# Patient Record
Sex: FEMALE | Race: WHITE
[De-identification: names, ages, dates, MRNs, and addresses within clinical notes are randomized per-mention and may not be internally consistent; named-entity substitution may affect disease eponyms.]

---

## 2019-12-18 ENCOUNTER — HOSPITAL ENCOUNTER (EMERGENCY)
Dept: HOSPITAL 97 - ER | Age: 74
Discharge: HOME | End: 2019-12-18
Payer: COMMERCIAL

## 2019-12-18 DIAGNOSIS — Z88.8: ICD-10-CM

## 2019-12-18 DIAGNOSIS — I10: ICD-10-CM

## 2019-12-18 DIAGNOSIS — I50.9: ICD-10-CM

## 2019-12-18 DIAGNOSIS — E03.9: ICD-10-CM

## 2019-12-18 DIAGNOSIS — R41.82: Primary | ICD-10-CM

## 2019-12-18 DIAGNOSIS — F41.8: ICD-10-CM

## 2019-12-18 DIAGNOSIS — E11.9: ICD-10-CM

## 2019-12-18 DIAGNOSIS — Z95.0: ICD-10-CM

## 2019-12-18 LAB
BUN BLD-MCNC: 22 MG/DL (ref 7–18)
GLUCOSE SERPLBLD-MCNC: 146 MG/DL (ref 74–106)
HCT VFR BLD CALC: 39.4 % (ref 36–45)
INR BLD: 0.98
LYMPHOCYTES # SPEC AUTO: 1.9 K/UL (ref 0.7–4.9)
PMV BLD: 10.9 FL (ref 7.6–11.3)
POTASSIUM SERPL-SCNC: 4.6 MMOL/L (ref 3.5–5.1)
RBC # BLD: 4.03 M/UL (ref 3.86–4.86)

## 2019-12-18 PROCEDURE — 93005 ELECTROCARDIOGRAM TRACING: CPT

## 2019-12-18 PROCEDURE — 36415 COLL VENOUS BLD VENIPUNCTURE: CPT

## 2019-12-18 PROCEDURE — 85025 COMPLETE CBC W/AUTO DIFF WBC: CPT

## 2019-12-18 PROCEDURE — 70496 CT ANGIOGRAPHY HEAD: CPT

## 2019-12-18 PROCEDURE — 85730 THROMBOPLASTIN TIME PARTIAL: CPT

## 2019-12-18 PROCEDURE — 80048 BASIC METABOLIC PNL TOTAL CA: CPT

## 2019-12-18 PROCEDURE — 85610 PROTHROMBIN TIME: CPT

## 2019-12-18 PROCEDURE — 71045 X-RAY EXAM CHEST 1 VIEW: CPT

## 2019-12-18 PROCEDURE — 99285 EMERGENCY DEPT VISIT HI MDM: CPT

## 2019-12-18 PROCEDURE — 70450 CT HEAD/BRAIN W/O DYE: CPT

## 2019-12-18 PROCEDURE — 82947 ASSAY GLUCOSE BLOOD QUANT: CPT

## 2019-12-18 NOTE — RAD REPORT
EXAM DESCRIPTION:  CT - Head angio - 12/18/2019 6:05 pm

 

CLINICAL HISTORY:  stroke symptoms

 

TECHNIQUE:  During dynamic enhancement using nonionic IV contrast, axial 1 millimeter thick images of
 the head were obtained. Sagittal and axial reconstruction images were generated using MIP technique 
and reviewed.

 

All CT scans are performed using dose optimization technique as appropriate and may include automated
 exposure control or mA/KV adjustment according to patient size.

 

COMPARISON:   CT head same date

 

FINDINGS:   No aneurysm or vascular malformation identified.

 

Major venous sinuses are patent.

 

No stenosis, named branch occlusion, vasculitis or other significant vascular finding identifiable. R
ight posterior cerebral artery is supplied via enlarged posterior communicating artery as a normal va
riant. Patient has prominent cavernous carotid calcifications.

 

 

IMPRESSION:   No occlusion, significant stenosis or other significant vascular finding.

## 2019-12-18 NOTE — RAD REPORT
EXAM DESCRIPTION:  CT - Ct Stroke Brain Wo Cont - 12/18/2019 3:07 pm

 

CLINICAL HISTORY:  Left-sided facial weakness, left-sided facial droop and slurred speech

 

CLINICAL HISTORY:  December 2016

 

TECHNIQUE:  Axial 5 millimeter thick images of the head were obtained without IV contrast.

 

All CT scans are performed using dose optimization technique as appropriate and may include automated
 exposure control or mA/KV adjustment according to patient size.

 

FINDINGS:  No intracranial hemorrhage, mass, or cerebral edema. No acute infarction identifiable. No 
cortical edema or sulcal effacement. Mild to moderate atrophy and chronic ischemic changes are presen
t similar to comparison. Gray matter-white matter differentiation is preserved.

Ventricles are in proportion to volume loss. Arterial and physiologic calcifications are present.

 

Visualized portions of the mastoid air cells, paranasal sinuses, and orbits are unremarkable.

 

Findings telephoned to the referring clinician 3:13 p.m..

 

IMPRESSION:  No CT evidence of acute intracranial process.

 

Atrophy and chronic ischemic changes are present similar to 2016.

 

 Chronic ischemic changes can mask nonhemorrhagic acute infarction. MR brain followup can be obtained
 if there is ongoing concern for acute ischemia. No

## 2019-12-18 NOTE — EKG
Test Date:    2019-12-18               Test Time:    15:20:21

Technician:   JOHN                                     

                                                     

MEASUREMENT RESULTS:                                       

Intervals:                                           

Rate:         72                                     

MS:                                                  

QRSD:         166                                    

QT:           490                                    

QTc:          536                                    

Axis:                                                

P:                                                   

MS:                                                  

QRS:          -56                                    

T:            103                                    

                                                     

INTERPRETIVE STATEMENTS:                                       

                                                     

Atrial-sensed ventricular-paced rhythm

with occasional premature ventricular complexes

Abnormal ECG

Compared to ECG 01/03/2018 20:52:04

Ventricular premature complex(es) now present



Electronically Signed On 12-18-19 16:18:04 CST by Duane Flores

## 2019-12-18 NOTE — RAD REPORT
EXAM DESCRIPTION:  RAD - Chest Single View - 12/18/2019 3:44 pm

 

CLINICAL HISTORY:  Code stroke chest film, left-sided facial weakness, altered mental status

 

COMPARISON:  January 2018

 

TECHNIQUE:  AP portable chest image was obtained 1518 hour .

 

FINDINGS:  Lung volumes are very low accentuating heart, vasculature and lung markings. No dense cons
olidation or mass. No prominent pulmonary edema. Mild interstitial edema or infiltrate could be maske
d. Pacemaker is again noted. PICC line has been removed since prior imaging. No measurable pleural ef
fusion and no pneumothorax. No acute bony abnormality seen. No acute aortic findings suspected.

 

IMPRESSION:  Limited portable imaging without peripheral mass or consolidation.

 

Mild failure/ volume overload could easily be masked by the chronic disease and low lung volume.

## 2019-12-18 NOTE — ER
Nurse's Notes                                                                                     

 [facility]                                                                 

Name: Miriam Rosales                                                                                  

Age: 74 yrs                                                                                       

Sex: Female                                                                                       

: 1945                                                                                   

MRN: [id number]                                                                                   

Arrival Date: 2019                                                                          

Time: 14:55                                                                                       

Account#: [id number]                                                                            

Bed 7                                                                                             

Private MD:                                                                                       

Diagnosis: Altered mental status, unspecified                                                     

                                                                                                  

Presentation:                                                                                     

                                                                                             

14:51 Presenting complaint: EMS states: called out for [facility] 45min-1 hr PTA EMS arrival, pt is  sv  

      normally A\T\Ox3 but was then A\T\Ox2 stated by staff, left sided facial droop, Afib w/ 2   

      runs of VTach. Transition of care: patient was received from another setting of care        

      (long-term care facility), [facility]. Onset of symptoms was 2019. Risk      

      Assessment: Do you want to hurt yourself or someone else? Patient reports no desire to      

      harm self or others. Initial Sepsis Screen: Does the patient meet any 2 criteria?           

      Altered Mental Status. No. Patient's initial sepsis screen is negative. Does the            

      patient have a suspected source of infection? No. Patient's initial sepsis screen is        

      negative. Care prior to arrival: IV attempted but was unsuccessful.                         

14:51 Method Of Arrival: EMS: [facility]                                                       sv  

14:54 Note Called and spoke with nurse at [facility] and she stated that pt's last known    sv  

      well was around 1400. She stated that the pt was in the DONs office with the doctor and     

      pt was normal at that time and placed out in the hallway. They went to go see her and       

      noted she was not normal. Stated she had slurred speech, left sided arm and leg             

      weakness, tongue was deviated to the right and no facial droop was noted at that time.      

      Informed Dr Mcarthur of what the NH staff stated and we are to call a Code Stroke.           

14:56 Acuity: FRIDA 2                                                                           sv  

                                                                                                  

Triage Assessment:                                                                                

14:55 General: Appears in no apparent distress. uncomfortable, well developed, Behavior is    sv  

      cooperative, appropriate for age, flat. Pain: Denies pain. Neuro: Level of                  

      Consciousness is awake, obeys commands, confused, Oriented to person, place, Moves all      

      extremities. Speech is slurred, Facial droop on left. Cardiovascular: Patient's skin is     

      warm and dry. Rhythm is atrial fibrillation. Respiratory: Airway is patent Respiratory      

      effort is even, unlabored, Respiratory pattern is regular, symmetrical. Derm: Skin is       

      pale.                                                                                       

                                                                                                  

Historical:                                                                                       

- Allergies:                                                                                      

15:10 Metformin HCl;                                                                          sv  

- Home Meds:                                                                                      

16:27 Abilify 5 mg oral tab 2 tabs once daily [Active]; Depakote Sprinkles 125 mg Oral cpSP 8 sv  

      caps nightly [Active]; Levemir 100 unit/mL subcutaneous soln 20 unit daily [Active];        

      levothyroxine 25 mcg tab 1 tab once daily [Active]; Novolog 100 unit/mL Sub-Q soln          

      [Active]; simvastatin 20 mg Oral tab 1 tab once daily [Active]; Xarelto 20 mg Oral tab      

      1 tab nightly [Active]; Zoloft 100 mg Oral tab 1 tab once daily [Active];                   

- PMHx:                                                                                           

15:10 Diabetes - NIDDM; Hypothyroidism; iron deficiency; Dementia; Muscle weakness; Psychotic sv  

      disorder w/ delusions; Anxiety; Depression; Hypertension; Atrial Fib; CHF; Cardiac          

      pacemaker; Right ankle pressure ulcer; Arthritis;                                           

                                                                                                  

- Immunization history:: Adult Immunizations up to date.                                          

- Social history:: Smoking status: Patient/guardian denies using tobacco.                         

- Ebola Screening: : No symptoms or risks identified at this time.                                

                                                                                                  

                                                                                                  

Screening:                                                                                        

15:00 Abuse screen: Denies threats or abuse. Denies injuries from another. Nutritional        sv  

      screening: No deficits noted. Tuberculosis screening: No symptoms or risk factors           

      identified. Fall Risk No fall in past 12 months (0 pts). Secondary diagnosis (15            

      points) dementia, No IV (0 pts). Ambulatory Aid- None/Bed Rest/Nurse Assist (0 pts).        

      Gait- Normal/Bed Rest/Wheelchair (0 pts) Mental Status- Overestimates/Forgets               

      Limitations (15 pts.). Total Liu Fall Scale indicates Low Risk Score (25-44 pts).         

      Fall prevention measures have been instituted. Side Rails Up X 2 Placed close to            

      Nursing Station Frequent Obs/Assesments occuring As available Patient and Family            

      Educated on Fall Prevention Program and strategies.                                         

16:22 Patient has been NPO before screening. The patient is alert, able to follow commands.   sv  

      The patient exhibits slurred or garbled speech. Provider notified of indication for         

      Speech Therapy consult. The patient failed the bedside swallow screening. The patient       

      will be kept NPO until cleared by Speech Therapy or Physician. Provider notified of         

      bedside swallow screening results: Iban Mcarthur MD.                                        

                                                                                                  

Assessment:                                                                                       

14:52 Reassessment: Dr Mcarthur at the bedside.                                                sv  

15:01 Reassessment: Code Stroke called.                                                       sv  

16:15 Reassessment: Patient appears in no apparent distress at this time. No changes from     sv  

      previously documented assessment. Patient and/or family updated on plan of care and         

      expected duration. Pain level reassessed. Family at the bedside.                            

18:23 Reassessment: Patient appears in no apparent distress at this time. No changes from     sv  

      previously documented assessment. Patient and/or family updated on plan of care and         

      expected duration. Pain level reassessed.                                                   

19:35 General: Appears in no apparent distress. comfortable, Behavior is calm, cooperative.   ao  

      Pain: Complains of pain in headache. Neuro: Level of Consciousness is awake, alert,         

      obeys commands, Oriented to person, place, time, situation, Appropriate for age Moves       

      all extremities. Full function Speech is normal. Cardiovascular: Heart tones S1 S2          

      Capillary refill < 3 seconds. Respiratory: Airway is patent Respiratory effort is even,     

      unlabored, Respiratory pattern is regular, symmetrical. GI: Abdomen is round obese. :     

      No signs and/or symptoms were reported regarding the genitourinary system. EENT: No         

      signs and/or symptoms were reported regarding the EENT system. Derm: Skin is intact,        

      Skin is pink, warm \T\ dry. Skin temperature is warm Decubitus. Musculoskeletal: No signs   

      and/or symptoms reported regarding the musculoskeletal system.                              

20:16 Reassessment: Patient appears in no apparent distress at this time. Patient and/or      ao  

      family updated on plan of care and expected duration. Pain level reassessed.                

20:53 Reassessment: Called report to [facility] and gave report to Juan. Pt is being      ao  

      discharge from the hospital. Juan stated he will call [facility] for transportation.            

21:54 Reassessment: Patient appears in no apparent distress at this time. Patient is alert,   ao  

      oriented x 3, equal unlabored respirations, skin warm/dry/pink. Waiting on                  

      transportation.                                                                             

22:09 Reassessment: Hand off care to [facility] for transportation. Pt VS stable.              ao  

                                                                                                  

Vital Signs:                                                                                      

14:52  / 80; Pulse 73; Resp 18; Temp 98; Pulse Ox 100% ; Pain 0/10;                     sv  

15:16  / 87; Pulse 69; Resp 18; Pulse Ox 100% ;                                         sv  

15:45  / 88; Pulse 72; Resp 18; Pulse Ox 100% ;                                         sv  

16:23  / 99; Pulse 74; Resp 19; Pulse Ox 100% ;                                         sv  

16:58  / 86; Pulse 72; Resp 19; Pulse Ox 98% on R/A;                                    sv  

17:45  / 99; Pulse 77; Resp 16; Pulse Ox 100% ;                                         sv  

18:23  / 98; Pulse 71; Resp 20; Pulse Ox 100% ;                                         sv  

20:16  / 103; Pulse 73; Resp 16; Pulse Ox 97% ;                                         ao  

21:39  / 84; Pulse 77; Resp 19; Pulse Ox 100% on R/A;                                   lp1 

22:11  / 86; Pulse 74; Resp 18; Pulse Ox 96% ;                                          ao  

                                                                                                  

ED Course:                                                                                        

14:55 Patient arrived in ED.                                                                  sv  

14:55 Vanessa Alexander, RN is Primary Nurse.                                                  sv  

14:55 Initial lab(s) drawn, by ED staff, sent to lab.                                         sv  

14:57 Triage completed.                                                                       sv  

14:58 Iban Mcarthur MD is Attending Physician.                                              kdr 

15:00 Inserted saline lock: 20 gauge in right antecubital area, using aseptic technique.      sg  

      Blood collected.                                                                            

15:00 Cardiac monitor on. Pulse ox on. NIBP on.                                               sv  

15:01 Patient moved to CT via stretcher.                                                      sv  

15:01 Patient has correct armband on for positive identification. Bed in low position. Call   sg  

      light in reach. Side rails up X2.                                                           

15:05 Arm band placed on.                                                                     sv  

15:10 Patient moved back from CT.                                                             sv  

15:13 CT Stroke Brain w/o Contrast In Process Unspecified.                                    EDMS

15:44 Stroke CXR 1 View In Process Unspecified.                                               EDMS

17:55 Patient moved to CT via stretcher.                                                      sv  

18:06 Head angio In Process Unspecified.                                                      EDMS

18:15 Patient moved back from CT.                                                             sv  

18:43 Awaiting radiology results.                                                             sv  

19:04 Primary Nurse role handed off by Vanessa Alexander, RN                                   sv  

19:04 Report given to Marguerite SHARIF and Inderjit SHARIF.                                                   sv  

19:08 Inderjit Farnsworth RN is Primary Nurse.                                                       ao  

22:11 No provider procedures requiring assistance completed. IV discontinued, intact,         ao  

      bleeding controlled, No redness/swelling at site. Pressure dressing applied.                

                                                                                                  

Administered Medications:                                                                         

No medications were administered                                                                  

                                                                                                  

                                                                                                  

Point of Care Testing:                                                                            

      Blood Glucose:                                                                              

15:00 Blood Glucose: 139 mg/dL;                                                               sg  

      Ranges:                                                                                     

                                                                                                  

Outcome:                                                                                          

20:44 Discharge ordered by MD.                                                                tw4 

22:11 Discharged to nursing home. Report called to  CHANTE Martinez Transfer form completed.        ao  

      Valuables list done. [facility]                                                              

22:11 Condition: stable                                                                           

22:12 Instructed on discharge instructions, follow up and referral plans.                     ao  

22:12 Patient left the ED.                                                                    ao  

                                                                                                  

Signatures:                                                                                       

Dispatcher [employer]                           EDMS                                                 

Vanessa Alexander RN RN                                                      

José Miguel Marinelli RN                         RN   Iban Mason MD MD kdr Pena, Laura, RN RN   1                                                  

Inderjit Farnsworth RN                         Spencer Aiken MD MD   tw4                                                  

                                                                                                  

Corrections: (The following items were deleted from the chart)                                    

20:25 19:35 Neuro: Level of Consciousness is awake, alert, obeys commands, Oriented to        ao  

      person, place, time, situation, Appropriate for age Moves all extremities. Full             

      function Speech is normal, ao                                                               

                                                                                                  

**************************************************************************************************

## 2019-12-18 NOTE — EDPHYS
Physician Documentation                                                                           

 [facility]                                                                 

Name: Miriam Rosales                                                                                  

Age: 74 yrs                                                                                       

Sex: Female                                                                                       

: 1945                                                                                   

MRN: [id number]                                                                                   

Arrival Date: 2019                                                                          

Time: 14:55                                                                                       

Account#: [id number]                                                                            

Bed 7                                                                                             

Private MD:                                                                                       

ED Physician Iban Mcarthur                                                                       

HPI:                                                                                              

                                                                                             

15:02 This 74 yrs old  Female presents to ER via Unassigned with complaints of       kdr 

      Altered Mental Status.                                                                      

15:02 This 74 yrs old  Female presents to ER via Unassigned with complaints of       kdr 

      Altered Mental Status, slurred speech and left facial droop.                                

15:02 The patient presents with confusion, decreased mental status, dysphasia. Onset: The     kdr 

      symptoms/episode began/occurred suddenly, at 14:00. Possible causes: CVA or TIA.            

      Associated signs and symptoms: Pertinent positives: confusion, weakness, slurred            

      speech. Current symptoms: In the emergency department the patient's symptoms have           

      improved, markedly. Patient's baseline: Neuro: alert and fully oriented, Motor: The         

      patient is generally weak. The patient has not experienced similar symptoms in the          

      past. The patient has been recently seen by a physician: the patient's primary care         

      provider. The patient was in the nursing office at about 1400 when they put her in the      

      [facility] and was at her baseline. When they came out, they noted that her speech was            

      slurred and she was confused and generally weak with left facial droop.                     

                                                                                                  

Historical:                                                                                       

- Allergies:                                                                                      

15:10 Metformin HCl;                                                                          sv  

- Home Meds:                                                                                      

16:27 Abilify 5 mg oral tab 2 tabs once daily [Active]; Depakote Sprinkles 125 mg Oral cpSP 8 sv  

      caps nightly [Active]; Levemir 100 unit/mL subcutaneous soln 20 unit daily [Active];        

      levothyroxine 25 mcg tab 1 tab once daily [Active]; Novolog 100 unit/mL Sub-Q soln          

      [Active]; simvastatin 20 mg Oral tab 1 tab once daily [Active]; Xarelto 20 mg Oral tab      

      1 tab nightly [Active]; Zoloft 100 mg Oral tab 1 tab once daily [Active];                   

- PMHx:                                                                                           

15:10 Diabetes - NIDDM; Hypothyroidism; iron deficiency; Dementia; Muscle weakness; Psychotic sv  

      disorder w/ delusions; Anxiety; Depression; Hypertension; Atrial Fib; CHF; Cardiac          

      pacemaker; Right ankle pressure ulcer; Arthritis;                                           

                                                                                                  

- Immunization history:: Adult Immunizations up to date.                                          

- Social history:: Smoking status: Patient/guardian denies using tobacco.                         

- Ebola Screening: : No symptoms or risks identified at this time.                                

                                                                                                  

                                                                                                  

ROS:                                                                                              

15:02 Constitutional: Negative for fever, chills, and weight loss, Eyes: Negative for injury, kdr 

      pain, redness, and discharge, ENT: Negative for injury, pain, and discharge, Neck:          

      Negative for injury, pain, and swelling, Cardiovascular: Negative for chest pain,           

      palpitations, and edema, Respiratory: Negative for shortness of breath, cough,              

      wheezing, and pleuritic chest pain, Abdomen/GI: Negative for abdominal pain, nausea,        

      vomiting, diarrhea, and constipation, Back: Negative for injury and pain, : Negative      

      for injury, bleeding, discharge, and swelling, MS/Extremity: Negative for injury and        

      deformity, Skin: Negative for injury, rash, and discoloration, Psych: Negative for          

      depression, anxiety, suicide ideation, homicidal ideation, and hallucinations,              

      Allergy/Immunology: Negative for hives, rash, and allergies, Endocrine: Negative for        

      neck swelling, polydipsia, polyuria, polyphagia, and marked weight changes,                 

      Hematologic/Lymphatic: Negative for swollen nodes, abnormal bleeding, and unusual           

      bruising.                                                                                   

15:02 Neuro: Positive for altered mental status, speech changes, weakness.                        

                                                                                                  

Exam:                                                                                             

15:02 Constitutional:  This is a well developed, well nourished patient who is awake, alert,  kdr 

      and in no acute distress. Head/Face:  Normocephalic, atraumatic. There is mild asymetry     

      ot the face but it is not dramatic Eyes:  Pupils equal round and reactive to light,         

      extra-ocular motions intact.  Lids and lashes normal.  Conjunctiva and sclera are           

      non-icteric and not injected.  Cornea within normal limits.  Periorbital areas with no      

      swelling, redness, or edema. Neck:  Trachea midline, no thyromegaly or masses palpated,     

      and no cervical lymphadenopathy.  Supple, full range of motion without nuchal rigidity,     

      or vertebral point tenderness.  No Meningismus. Chest/axilla:  Normal chest wall            

      appearance and motion.  Nontender with no deformity.  No lesions are appreciated.           

      Cardiovascular:  Regular rate and rhythm with a normal S1 and S2.  No gallops, murmurs,     

      or rubs.  Normal PMI, no JVD.  No pulse deficits. Respiratory:  Lungs have equal breath     

      sounds bilaterally, clear to auscultation and percussion.  No rales, rhonchi or wheezes     

      noted.  No increased work of breathing, no retractions or nasal flaring. Abdomen/GI:        

      Soft, non-tender, with normal bowel sounds.  No distension or tympany.  No guarding or      

      rebound.  No evidence of tenderness throughout. Back:  No spinal tenderness.  No            

      costovertebral tenderness.  Full range of motion. Skin:  Warm, dry with normal turgor.      

      Normal color with no rashes, no lesions, and no evidence of cellulitis. MS/ Extremity:      

      Pulses equal, no cyanosis.  Neurovascular intact.  Full, normal range of motion. Psych:     

       Awake, alert, with orientation to person, place and time.  Behavior, mood, and affect      

      are within normal limits.                                                                   

15:02 Neuro: Orientation: to person, place \T\ time. Mentation: able to follow commands, slow     

      to respond, confused.                                                                       

                                                                                                  

Vital Signs:                                                                                      

14:52  / 80; Pulse 73; Resp 18; Temp 98; Pulse Ox 100% ; Pain 0/10;                     sv  

15:16  / 87; Pulse 69; Resp 18; Pulse Ox 100% ;                                         sv  

15:45  / 88; Pulse 72; Resp 18; Pulse Ox 100% ;                                         sv  

16:23  / 99; Pulse 74; Resp 19; Pulse Ox 100% ;                                         sv  

16:58  / 86; Pulse 72; Resp 19; Pulse Ox 98% on R/A;                                    sv  

17:45  / 99; Pulse 77; Resp 16; Pulse Ox 100% ;                                         sv  

18:23  / 98; Pulse 71; Resp 20; Pulse Ox 100% ;                                         sv  

20:16  / 103; Pulse 73; Resp 16; Pulse Ox 97% ;                                         ao  

21:39  / 84; Pulse 77; Resp 19; Pulse Ox 100% on R/A;                                   lp1 

22:11  / 86; Pulse 74; Resp 18; Pulse Ox 96% ;                                          ao  

                                                                                                  

MDM:                                                                                              

20:44 Patient medically screened.                                                             4 

                                                                                                  

                                                                                             

14:59 Order name: Basic Metabolic Panel; Complete Time: 17:48                                 kdr 

                                                                                             

14:59 Order name: CBC with Diff; Complete Time: 17:48                                         kdr 

                                                                                             

14:59 Order name: Protime (+inr); Complete Time: 17:48                                        kdr 

12/18                                                                                             

14:59 Order name: Ptt, Activated; Complete Time: 17:48                                        kdr 

                                                                                             

14:59 Order name: CT Stroke Brain w/o Contrast; Complete Time: 17:48                          kdr 

                                                                                             

15:12 Order name: Glucose, Ancillary Testing; Complete Time: 17:48                            EDMS

                                                                                             

14:59 Order name: Stroke CXR 1 View; Complete Time: 17:48                                     kdr 

                                                                                             

14:59 Order name: EKG; Complete Time: 15:00                                                   kdr 

                                                                                             

14:59 Order name: Accucheck; Complete Time: 16:05                                             kdr 

                                                                                             

14:59 Order name: Cardiac monitoring; Complete Time: 16:05                                    kdr 

                                                                                             

14:59 Order name: EKG - Nurse/Tech; Complete Time: 16:18                                      kdr 

                                                                                             

14:59 Order name: IV Saline Lock; Complete Time: 16:06                                        kdr 

                                                                                             

17:27 Order name: Head angio; Complete Time: 20:09                                            EDMS

                                                                                             

14:59 Order name: Labs collected and sent; Complete Time: 16:06                               kdr 

                                                                                             

14:59 Order name: NPO; Complete Time: 16:06                                                   kdr 

                                                                                             

14:59 Order name: O2 Per Protocol; Complete Time: 16:06                                       kdr 

                                                                                             

14:59 Order name: O2 Sat Monitoring; Complete Time: 16:06                                     kdr 

                                                                                             

14:59 Order name: Stroke Swallow Screen; Complete Time: 16:36                                 kdr 

                                                                                                  

Administered Medications:                                                                         

No medications were administered                                                                  

                                                                                                  

                                                                                                  

Point of Care Testing:                                                                            

      Blood Glucose:                                                                              

15:00 Blood Glucose: 139 mg/dL;                                                               sg  

      Ranges:                                                                                     

      Critical Glucose Levels:Adult <50 mg/dl or >400 mg/dl  <40 mg/dl or >180 mg/dl       

Disposition:                                                                                      

19 20:44 Discharged to Home. Impression: Altered mental status, unspecified.                

- Condition is Stable.                                                                            

- Discharge Instructions: Delirium.                                                               

                                                                                                  

- Medication Reconciliation Form, Thank You Letter, Antibiotic Education, Prescription            

  Opioid Use form.                                                                                

- Follow up: Private Physician; When: Upon discharge from the Emergency Department;               

  Reason: Recheck today's complaints, Continuance of care.                                        

- Problem is new.                                                                                 

- Symptoms have improved.                                                                         

                                                                                                  

                                                                                                  

                                                                                                  

Signatures:                                                                                       

Dispatcher [facility]                           Vanessa Avendano RN RN sv Rittger, Kevin, MD MD kdr Ortiz, Alex, RN RN ao Wadley, Terrence, MD                    MD   [id number]                                                  

                                                                                                  

Corrections: (The following items were deleted from the chart)                                    

17:48 15:03 MR STROKE PROTOCOL+MRI.RAD.BRZ ordered. EDMS                                      EDMS

22:12 20:44 2019 20:44 Discharged to Home. Impression: Altered mental status,           ao  

      unspecified. Condition is Stable. Forms are Medication Reconciliation Form, Thank You       

      Letter, Antibiotic Education, Prescription Opioid Use. Follow up: Private Physician;        

      When: Upon discharge from the Emergency Department; Reason: Recheck today's complaints,     

      Continuance of care. Problem is new. Symptoms have improved. tw4                            

                                                                                                  

**************************************************************************************************

## 2019-12-19 VITALS — SYSTOLIC BLOOD PRESSURE: 123 MMHG | DIASTOLIC BLOOD PRESSURE: 86 MMHG | OXYGEN SATURATION: 96 %

## 2019-12-19 VITALS — TEMPERATURE: 98 F

## 2019-12-21 ENCOUNTER — HOSPITAL ENCOUNTER (EMERGENCY)
Dept: HOSPITAL 97 - ER | Age: 74
Discharge: HOME | End: 2019-12-21
Payer: COMMERCIAL

## 2019-12-21 VITALS — SYSTOLIC BLOOD PRESSURE: 158 MMHG | OXYGEN SATURATION: 97 % | DIASTOLIC BLOOD PRESSURE: 87 MMHG

## 2019-12-21 VITALS — TEMPERATURE: 97.9 F

## 2019-12-21 DIAGNOSIS — Z23: ICD-10-CM

## 2019-12-21 DIAGNOSIS — I10: ICD-10-CM

## 2019-12-21 DIAGNOSIS — I50.9: ICD-10-CM

## 2019-12-21 DIAGNOSIS — E11.9: ICD-10-CM

## 2019-12-21 DIAGNOSIS — Z88.8: ICD-10-CM

## 2019-12-21 DIAGNOSIS — G44.319: Primary | ICD-10-CM

## 2019-12-21 DIAGNOSIS — Z95.0: ICD-10-CM

## 2019-12-21 DIAGNOSIS — Z79.4: ICD-10-CM

## 2019-12-21 DIAGNOSIS — E03.9: ICD-10-CM

## 2019-12-21 PROCEDURE — 0JQ10ZZ REPAIR FACE SUBCUTANEOUS TISSUE AND FASCIA, OPEN APPROACH: ICD-10-PCS

## 2019-12-21 PROCEDURE — 90471 IMMUNIZATION ADMIN: CPT

## 2019-12-21 PROCEDURE — 71250 CT THORAX DX C-: CPT

## 2019-12-21 PROCEDURE — 90714 TD VACC NO PRESV 7 YRS+ IM: CPT

## 2019-12-21 PROCEDURE — 99285 EMERGENCY DEPT VISIT HI MDM: CPT

## 2019-12-21 PROCEDURE — 70450 CT HEAD/BRAIN W/O DYE: CPT

## 2019-12-21 PROCEDURE — 72125 CT NECK SPINE W/O DYE: CPT

## 2019-12-21 NOTE — ER
Nurse's Notes                                                                                     

 [facility]                                                                 

Name: Miriam Rosales                                                                                  

Age: 74 yrs                                                                                       

Sex: Female                                                                                       

: 1945                                                                                   

MRN: [id number]                                                                                   

Arrival Date: 2019                                                                          

Time: 08:27                                                                                       

Account#: [id number]                                                                            

Bed 4                                                                                             

Private MD:                                                                                       

Diagnosis: Acute post-traumatic headache                                                          

                                                                                                  

Presentation:                                                                                     

                                                                                             

08:22 Presenting complaint: EMS states: pt from [facility], was sitting in chair  tw2 

      in dining room, fell face forward, laceration to LEFT eyebrow area, vs stable, pt is on     

      Xarelto, a\T\o to self and place. Transition of care: patient was not received from         

      another setting of care. Onset of symptoms was 2019. Risk Assessment: Do       

      you want to hurt yourself or someone else? Patient reports no desire to harm self or        

      others. Initial Sepsis Screen: Does the patient meet any 2 criteria? No. Patient's          

      initial sepsis screen is negative. Does the patient have a suspected source of              

      infection? No. Patient's initial sepsis screen is negative. Care prior to arrival: None.    

08:22 Method Of Arrival: EMS: [facility]                                                       tw2 

08:22 Acuity: FRIDA 3                                                                           tw2 

08:22 Mechanism of Injury: Fall sitting. Trauma event details: Injury occurred in the [address]                                                                                

                                                                                                  

Trauma Activation: Alert                                                                          

 Physician: ED Physician; Name: ; Notified At: ; Arrived At:                                      

 Physician: General Surgeon; Name: ; Notified At: ; Arrived At:                                   

 Physician: Radiology; Name: ; Notified At: ; Arrived At:                                         

 Physician: Respiratory; Name: ; Notified At: ; Arrived At:                                       

 Physician: Lab; Name: ; Notified At: ; Arrived At:                                               

                                                                                                  

Historical:                                                                                       

- Allergies:                                                                                      

08:36 Metformin HCl;                                                                          tw2 

- Home Meds:                                                                                      

08:36 Abilify 5 mg Oral tab 2 tabs once daily [Active]; Depakote Sprinkles 125 mg Oral cpSP 8 tw2 

      caps nightly [Active];                                                                      

08:40 Levemir 100 unit/mL subcutaneous soln [Active]; Novolog 100 unit/mL Sub-Q soln          tw2 

      [Active]; simvastatin 20 mg Oral tab 1 tab once daily [Active]; Xarelto 20 mg Oral tab      

      1 tab nightly [Active]; acetaminophen 325 mg Oral tab 1 tab every 4 hours [Active];         

      Zoloft 100 mg Oral tab 1 tab once daily [Active]; levothyroxine 25 mcg tab 1 tab once       

      daily [Active]; ipratropium-albuterol 0.5 mg-3 mg(2.5 mg base)/3 mL Inhl nebu 3 mL 4        

      times per day [Active];                                                                     

- PMHx:                                                                                           

08:36 Right ankle pressure ulcer; Psychotic disorder w/ delusions; MUSCLE WEAKNESS; iron      tw2 

      deficiency; Hypertension; Hypothyroidism; CARDIAC PACEMAKER; Atrial Fib; CHF; Dementia;     

      Arthritis; Anxiety; Depression; Diabetes - IDDM;                                            

                                                                                                  

- Immunization history:: Adult Immunizations unknown.                                             

- Social history:: Smoking status: Patient/guardian denies using alcohol, street drugs,           

  The patient lives with spouse.                                                                  

- Immunization history: Last tetanus immunization: unknown received vaccine today.                

- Ebola Screening: : Patient denies travel to an Ebola-affected area in the 21 days               

  before illness onset.                                                                           

- Family history:: not pertinent.                                                                 

                                                                                                  

                                                                                                  

Screenin:22 Abuse screen: Denies threats or abuse. Denies injuries from another. Tuberculosis       jl7 

      screening: No symptoms or risk factors identified.                                          

09:09 Nutritional screening: No deficits noted. Fall Risk Fall in past 12 months (25 points). jl7 

      Secondary diagnosis (15 points) Alzheimer's, No IV (0 pts). Ambulatory Aid-                 

      Crutches/Cane/Walker (15 pts). Gait- Weak (10 pts.). Mental Status-                         

      Overestimates/Forgets Limitations (15 pts.). Total Liu Fall Scale indicates High Risk     

      Score (45 or more points). Fall prevention measures have been instituted. Side Rails Up     

      X 2 Placed Close to Nursing Station Frequent Obs/Assessments Occuring As available          

      patient and family educated on Fall Prevention Program and Strategies.                      

                                                                                                  

Primary Survey:                                                                                   

08:22 NO uncontrolled hemorrhage observed. A: Airway: patent. Breathing/Chest: Respiratory    jl7 

      pattern: regular, Respiratory effort: spontaneous, unlabored, Chest inspection:             

      symmetrical rise and fall of the chest. Circulation: Skin color: pink, Skin                 

      temperature: warm. Disability Alert. Exposure/Environment: A warming method has been        

      applied: A warm blanket has been provided to the patient.                                   

09:30 Reassessment Airway Airway Patent Breathing/Chest Respiratory pattern Regular           tw2 

      Respiratory effort Spontaneous Unlabored Circulation Heart tones Present Disability         

      Alert.                                                                                      

                                                                                                  

Secondary Survey:                                                                                 

10:26 HEENT: Face Other laceration above LEFT eye brow, abrasion noted to left cheek.         tw2 

      Gastrointestinal: Abdomen is soft, Bowel sounds present in all quadrants. : No signs      

      and/or symptoms were reported regarding the genitourinary system. Musculoskeletal: No       

      signs and/or symptoms reported regarding the musculoskeletal system.                        

                                                                                                  

Assessment:                                                                                       

08:22 General: Appears in no apparent distress. Behavior is calm, cooperative, appropriate    tw2 

      for age. Pain: Denies pain. Neuro: Level of Consciousness is awake, alert, Oriented to      

      person. EENT: No signs and/or symptoms were reported regarding the EENT system.             

      Cardiovascular: Heart tones S1 S2 Patient's skin is warm and dry. Rhythm is atrial          

      fibrillation. Respiratory: Airway is patent Respiratory effort is even, unlabored,          

      Respiratory pattern is regular, symmetrical, Breath sounds are clear bilaterally. GI:       

      No signs and/or symptoms were reported involving the gastrointestinal system. Abdomen       

      is round non-distended, obese, Bowel sounds present X 4 quads. : No signs and/or          

      symptoms were reported regarding the genitourinary system. Derm: abrasion noted             

      underneath LEFT eye with small laceration noted above LEFT eyebrow. Musculoskeletal:        

      Range of motion: intact in all extremities.                                                 

09:26 Reassessment: Patient appears in no apparent distress at this time. Patient and/or      tw2 

      family updated on plan of care and expected duration. Pain level reassessed. notified       

      nurse at [place] of need for transportation back to their facility as pt has pending      

      discharge, updated with pts status.                                                         

10:26 Reassessment: Patient appears in no apparent distress at this time. Patient and/or      tw2 

      family updated on plan of care and expected duration. Pain level reassessed.                

                                                                                                  

Vital Signs:                                                                                      

08:25  / 94; Pulse 85; Resp 19; Temp 97.9(TE); Pulse Ox 99% on R/A; Weight 83.91 kg     tw2 

      (R); Height 5 ft. 2 in. (157.48 cm);                                                        

09:27  / 94; Pulse 81; Resp 21; Pulse Ox 99% on R/A;                                    tw2 

10:26  / 87; Pulse 81; Resp 17; Pulse Ox 97% on R/A;                                    tw2 

08:25 Body Mass Index 33.84 (83.91 kg, 157.48 cm)                                             tw2 

                                                                                                  

[place] Coma Score:                                                                               

08:22 Eye Response: spontaneous(4). Verbal Response: oriented(5). Motor Response: obeys       jl7 

      commands(6). Total: 15.                                                                     

                                                                                                  

Trauma Score (Adult):                                                                             

08:22 Eye Response: spontaneous(1); Verbal Response: oriented(1); Motor Response: obeys       jl7 

      commands(2); Systolic BP: > 89 mm Hg(4); Respiratory Rate: 10 to 29 per min(4); Abraham     

      Score: 15; Trauma Score: 12                                                                 

                                                                                                  

ED Course:                                                                                        

08:22 Patient has correct armband on for positive identification. Placed in gown. Bed in low  jl7 

      position. Call light in reach. Side rails up X2.                                            

08:22 Patient maintains SpO2 saturation greater than 95% on room air. Thermoregulation: warm  jl7 

      blanket given to patient.                                                                   

08:22 Missed attempt(s): 22 gauge in right hand. per CHANTE Valverde. Bleeding controlled, band aid tw2 

      applied, catheter tip intact.                                                               

08:27 Patient arrived in ED.                                                                  tw2 

08:27 Jluis Villanueva MD is Attending Physician.                                           ma 

08:29 Triage completed.                                                                       tw2 

08:30 Denise Mayfield RN is Primary Nurse.                                                        tw2 

08:30 Arm band placed on.                                                                     tw2 

08:48 CT Traumagram (Head C Spine CAP wo con) In Process Unspecified.                         EDMS

09:16 Assist provider with laceration repair on middle aspect of left eyebrow and outer       tw2 

      aspect of left eyebrow that was 2.5 cm. or less using sutures. Set up tray. Performed       

      by Jluis Villanueva MD Patient tolerated well.                                              

10:03 Awaiting transportation.                                                                tw2 

10:29 Patient did not have IV access during this emergency room visit.                        tw2 

                                                                                                  

Administered Medications:                                                                         

09:04 Drug: Tetanus-Diphtheria Toxoid Adult 0.5 ml {: [employer]. Exp:         jl7 

      2021. Lot #: [id number]. } Route: IM; Site: right deltoid;                                 

09:18 Follow up: Response: No adverse reaction                                                tw2 

09:18 Drug: Lidocaine-Epinephrine -1%: (1:100,000) 1 vials {Note: via Dr. Villanueva.} Volume:   tw2 

      20 ml; Route: Infiltration;                                                                 

                                                                                                  

                                                                                                  

Intake:                                                                                           

10:29 PO: 0ml; Total: 0ml.                                                                    tw2 

                                                                                                  

Outcome:                                                                                          

:28 Discharge ordered by MD.                                                                ma2 

10:28 Discharged to nursing home. Report called to  nurse at [facility] who arranged           tw2 

      transportation back to their facility                                                       

10: Condition: stable                                                                           

10:28 Patient's length of stay in the Emergency Department was greater than 2 hours. d/t          

      transportation bk to pts facilityPatient's length of stay extended due to                   

10:29 Discharge instructions given to patient, nursing home, Instructed on discharge          tw2 

      instructions, follow up and referral plans. wound care, suture removal in 5 days            

      Demonstrated understanding of instructions, follow-up care, wound care.                     

10:29 Patient left the ED.                                                                    tw2 

                                                                                                  

Signatures:                                                                                       

Dispatcher [employer]                           EDDenise Chakraborty RN                          RN   tw2                                                  

Abram Alves RN                        RN   jl7                                                  

Jluis Villanueva MD MD   ma2                                                  

                                                                                                  

**************************************************************************************************

## 2019-12-21 NOTE — RAD REPORT
EXAM DESCRIPTION:

CT - Head C Spine Cap Wo Con - 12/21/2019 8:46 am

 

TECHNIQUE:  Computed axial tomography of the head and cervical spine was obtained. Coronal and sagitt
al reconstruction was performed

 

Computed axial tomography of the chest, abdomen and pelvis was obtained. Contrast was not requested.

 

All CT scans are performed using dose optimization technique as appropriate and may include automated
 exposure control or mA/KV adjustment according to patient size.

 

CLINICAL HISTORY:

Head and neck injury with chest and abdominal pain status post fall

 

COMPARISON:  2018

 

FINDINGS:

Left supraorbital laceration

 

An intracranial bleed is not seen.

 

The ventricles are normal in caliber. An extra-axial fluid collection is not noted. .

 

Fluid within the sinuses/mastoids is not seen.

 

A cervical fracture is not seen. No dislocation is noted. Spondylosis involves the cervical spine

 

The evaluation of mediastinum, chapo, vessels, solid organs and bowel are limited secondary to the lac
k of contrast administration.

 

A mediastinal hematoma is not noted. A pleural effusion is not seen. A lung contusion is not present.


 

The liver,spleen, pancreas, adrenals,kidneys and bladder did not demonstrated traumatic injury

 

IMPRESSION:

1. No acute intracranial abnormality is seen.

 

2. A cervical fracture is not visualized. If the patient continues have symptoms to suggest intracran
ial/spinal cord pathology MRI be recommended

 

3. No traumatic abnormality involving the chest/abdomen/pelvis.

## 2019-12-21 NOTE — EDPHYS
Physician Documentation                                                                           

 [facility]                                                                 

Name: Miriam Rosales                                                                                  

Age: 74 yrs                                                                                       

Sex: Female                                                                                       

: 1945                                                                                   

MRN: [id number]                                                                                   

Arrival Date: 2019                                                                          

Time: 08:27                                                                                       

Account#: [id number]                                                                            

Bed 4                                                                                             

Private MD:                                                                                       

ED Physician Jluis Villanueva                                                                    

HPI:                                                                                              

                                                                                             

09:26 This 74 yrs old  Female presents to ER via EMS with complaints of Fall Injury, ma2 

      Laceration To Scalp/Face.                                                                   

09:26 Details of fall: The patient fell from seated position. Associated injuries: The        ma2 

      patient sustained injury to the head. Severity of symptoms: At their worst the symptoms     

      were mild, moderate, in the emergency department the symptoms are unchanged. The            

      patient has experienced similar episodes in the past.                                       

                                                                                                  

Historical:                                                                                       

- Allergies:                                                                                      

08:36 Metformin HCl;                                                                          tw2 

- Home Meds:                                                                                      

08:36 Abilify 5 mg Oral tab 2 tabs once daily [Active]; Depakote Sprinkles 125 mg Oral cpSP 8 tw2 

      caps nightly [Active];                                                                      

08:40 Levemir 100 unit/mL subcutaneous soln [Active]; Novolog 100 unit/mL Sub-Q soln          tw2 

      [Active]; simvastatin 20 mg Oral tab 1 tab once daily [Active]; Xarelto 20 mg Oral tab      

      1 tab nightly [Active]; acetaminophen 325 mg Oral tab 1 tab every 4 hours [Active];         

      Zoloft 100 mg Oral tab 1 tab once daily [Active]; levothyroxine 25 mcg tab 1 tab once       

      daily [Active]; ipratropium-albuterol 0.5 mg-3 mg(2.5 mg base)/3 mL Inhl nebu 3 mL 4        

      times per day [Active];                                                                     

- PMHx:                                                                                           

08:36 Right ankle pressure ulcer; Psychotic disorder w/ delusions; MUSCLE WEAKNESS; iron      tw2 

      deficiency; Hypertension; Hypothyroidism; CARDIAC PACEMAKER; Atrial Fib; CHF; Dementia;     

      Arthritis; Anxiety; Depression; Diabetes - IDDM;                                            

                                                                                                  

- Immunization history:: Adult Immunizations unknown.                                             

- Social history:: Smoking status: Patient/guardian denies using alcohol, street drugs,           

  The patient lives with spouse.                                                                  

- Immunization history: Last tetanus immunization: unknown received vaccine today.                

- Ebola Screening: : Patient denies travel to an Ebola-affected area in the 21 days               

  before illness onset.                                                                           

- Family history:: not pertinent.                                                                 

                                                                                                  

                                                                                                  

ROS:                                                                                              

09:26 Constitutional: Negative for fever, chills, and weight loss.                            ma2 

09:26 All other systems are negative.                                                             

                                                                                                  

Exam:                                                                                             

09:26 Constitutional:  This is a well developed, well nourished patient who is awake, alert,  ma2 

      and in no acute distress. Head/Face:  left eyebraw laceration 1 cm deep with skin loss      

      Eyes:  Pupils equal round and reactive to light, extra-ocular motions intact.  Lids and     

      lashes normal.  Conjunctiva and sclera are non-icteric and not injected.  Cornea within     

      normal limits.  Periorbital areas with no swelling, redness, or edema. ENT:  Nares          

      patent. No nasal discharge, no septal abnormalities noted.  Tympanic membranes are          

      normal and external auditory canals are clear.  Oropharynx with no redness, swelling,       

      or masses, exudates, or evidence of obstruction, uvula midline.  Mucous membranes           

      moist. Neck:  Trachea midline, no thyromegaly or masses palpated, and no cervical           

      lymphadenopathy.  Supple, full range of motion without nuchal rigidity, or vertebral        

      point tenderness.  No Meningismus. Chest/axilla:  Normal chest wall appearance and          

      motion.  Nontender with no deformity.  No lesions are appreciated. Cardiovascular:          

      Regular rate and rhythm with a normal S1 and S2.  No gallops, murmurs, or rubs.  Normal     

      PMI, no JVD.  No pulse deficits. Respiratory:  Lungs have equal breath sounds               

      bilaterally, clear to auscultation and percussion.  No rales, rhonchi or wheezes noted.     

       No increased work of breathing, no retractions or nasal flaring. Abdomen/GI:  Soft,        

      non-tender, with normal bowel sounds.  No distension or tympany.  No guarding or            

      rebound.  No evidence of tenderness throughout.                                             

                                                                                                  

Vital Signs:                                                                                      

08:25  / 94; Pulse 85; Resp 19; Temp 97.9(TE); Pulse Ox 99% on R/A; Weight 83.91 kg     tw2 

      (R); Height 5 ft. 2 in. (157.48 cm);                                                        

09:27  / 94; Pulse 81; Resp 21; Pulse Ox 99% on R/A;                                    tw2 

10:26  / 87; Pulse 81; Resp 17; Pulse Ox 97% on R/A;                                    tw2 

08:25 Body Mass Index 33.84 (83.91 kg, 157.48 cm)                                             tw2 

                                                                                                  

[place] Coma Score:                                                                               

08:22 Eye Response: spontaneous(4). Verbal Response: oriented(5). Motor Response: obeys       jl7 

      commands(6). Total: 15.                                                                     

                                                                                                  

Trauma Score (Adult):                                                                             

08:22 Eye Response: spontaneous(1); Verbal Response: oriented(1); Motor Response: obeys       jl7 

      commands(2); Systolic BP: > 89 mm Hg(4); Respiratory Rate: 10 to 29 per min(4); [place]     

      Score: 15; Trauma Score: 12                                                                 

                                                                                                  

Laceration:                                                                                       

09:26 Wound Repair of 2cm ( 0.8in ) subcutaneous laceration to left eye. Distal               ma2 

      neuro/vascular/tendon intact. Anesthesia: Local anesthetic administered with 10 mls of      

      1% lidocaine. Wound prep: Simple cleansing. Skin closed with 2 5-0 Prolene using simple     

      sutures and sterile technique. Patient tolerated well.                                      

                                                                                                  

MDM:                                                                                              

08:27 Patient medically screened.                                                             ma2 

09:26 Differential diagnosis: abrasion, closed head injury, contusion, fracture. Data         ma2 

      reviewed: vital signs, nurses notes. Counseling: I had a detailed discussion with the       

      patient and/or guardian regarding: the historical points, exam findings, and any            

      diagnostic results supporting the discharge/admit diagnosis, the presence of at least       

      one elevated blood pressure reading (>120/80) during this emergency department visit,       

      the need for outpatient follow up. Response to treatment: the patient's symptoms have       

      mildly improved after treatment.                                                            

                                                                                                  

                                                                                             

08:29 Order name: CT Traumagram (Head C Spine CAP wo con); Complete Time: 09:26               ma2 

                                                                                             

08:29 Order name: Dressing - Wound; Complete Time: 09:33                                      ma2 

                                                                                             

09:06 Order name: Gloves, Sterile; Complete Time: 09:06                                       jl7 

                                                                                             

09:06 Order name: Setup Suture Tray; Complete Time: 09:06                                      

                                                                                                  

Administered Medications:                                                                         

09:04 Drug: Tetanus-Diphtheria Toxoid Adult 0.5 ml {: CelePost. Exp:         jl7 

      2021. Lot #: [id number]. } Route: IM; Site: right deltoid;                                 

09:18 Follow up: Response: No adverse reaction                                                tw2 

09:18 Drug: Lidocaine-Epinephrine -1%: (1:100,000) 1 vials {Note: via Dr. Villanueva.} Volume:   tw2 

      20 ml; Route: Infiltration;                                                                 

                                                                                                  

                                                                                                  

Disposition:                                                                                      

19 09:28 Discharged to Home. Impression: Acute post-traumatic headache.                     

- Condition is Stable.                                                                            

- Discharge Instructions: Concussion, Adult, Fall Prevention in Hospitals, Adult.                 

                                                                                                  

- Medication Reconciliation Form, Thank You Letter, Antibiotic Education, Prescription            

  Opioid Use form.                                                                                

- Follow up: Private Physician; When: Tomorrow; Reason: Continuance of care.                      

- Notes: remove 2 stiches in 5 days                                                               

                                                                                                  

                                                                                                  

Signatures:                                                                                       

Dispatcher [employer]                           EDDenise Chakraborty RN                          RN   tw2                                                  

Abram Alves RN                        RN   jl7                                                  

Jluis Villanueva MD MD   ma2                                                  

                                                                                                  

Corrections: (The following items were deleted from the chart)                                    

10:29 09:28 2019 09:28 Discharged to Home. Impression: Acute post-traumatic headache.   tw2 

      Condition is Stable. Forms are Medication Reconciliation Form, Thank You Letter,            

      Antibiotic Education, Prescription Opioid Use. Follow up: Private Physician; When:          

      Tomorrow; Reason: Continuance of care. ma2                                                  

                                                                                                  

**************************************************************************************************

## 2020-03-01 ENCOUNTER — HOSPITAL ENCOUNTER (EMERGENCY)
Dept: HOSPITAL 97 - ER | Age: 75
Discharge: HOME | End: 2020-03-01
Payer: COMMERCIAL

## 2020-03-01 VITALS — SYSTOLIC BLOOD PRESSURE: 147 MMHG | OXYGEN SATURATION: 99 % | DIASTOLIC BLOOD PRESSURE: 99 MMHG

## 2020-03-01 VITALS — TEMPERATURE: 97.4 F
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
## 2020-03-01 DIAGNOSIS — S06.0X0A: Primary | ICD-10-CM

## 2020-03-01 DIAGNOSIS — Z79.4: ICD-10-CM

## 2020-03-01 DIAGNOSIS — F32.9: ICD-10-CM

## 2020-03-01 DIAGNOSIS — I48.91: ICD-10-CM

## 2020-03-01 DIAGNOSIS — S05.72XA: ICD-10-CM

## 2020-03-01 DIAGNOSIS — Z23: ICD-10-CM

## 2020-03-01 DIAGNOSIS — Z79.01: ICD-10-CM
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
## 2020-03-01 DIAGNOSIS — F41.9: ICD-10-CM

## 2020-03-01 DIAGNOSIS — I10: ICD-10-CM

## 2020-03-01 DIAGNOSIS — Z95.0: ICD-10-CM
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
## 2020-03-01 DIAGNOSIS — E11.9: ICD-10-CM

## 2020-03-01 DIAGNOSIS — E03.9: ICD-10-CM

## 2020-03-01 PROCEDURE — 96375 TX/PRO/DX INJ NEW DRUG ADDON: CPT

## 2020-03-01 PROCEDURE — 73030 X-RAY EXAM OF SHOULDER: CPT

## 2020-03-01 PROCEDURE — 70450 CT HEAD/BRAIN W/O DYE: CPT

## 2020-03-01 PROCEDURE — 70486 CT MAXILLOFACIAL W/O DYE: CPT

## 2020-03-01 PROCEDURE — 90714 TD VACC NO PRESV 7 YRS+ IM: CPT

## 2020-03-01 PROCEDURE — 90471 IMMUNIZATION ADMIN: CPT

## 2020-03-01 PROCEDURE — 76377 3D RENDER W/INTRP POSTPROCES: CPT

## 2020-03-01 PROCEDURE — 96374 THER/PROPH/DIAG INJ IV PUSH: CPT

## 2020-03-01 PROCEDURE — 72125 CT NECK SPINE W/O DYE: CPT

## 2020-03-01 PROCEDURE — 99284 EMERGENCY DEPT VISIT MOD MDM: CPT

## 2020-03-01 NOTE — EDPHYS
Physician Documentation                                                                           

 [facility]                                                                 

Name: Miriam Rosales                                                                                  

Age: 75 yrs                                                                                       

Sex: Female                                                                                       

: 1945                                                                                   

MRN: [id number]                                                                                   

Arrival Date: 2020                                                                          

Time: 05:15                                                                                       

Account#: [id number]                                                                            

Bed 8                                                                                             

Private MD:                                                                                       

ED Physician Spencer Castro                                                                     

HPI:                                                                                              

                                                                                             

07:20 This 75 yrs old  Female presents to ER via EMS with complaints of Fall Injury. tw4 

07:20 Details of fall: The patient fell from seated position, off the edge of a bed. Onset:   tw4 

      The symptoms/episode began/occurred today. Associated injuries: The patient sustained       

      left cheek. Severity of symptoms: At their worst the symptoms were moderate, in the         

      emergency department the symptoms are unchanged. The patient has not experienced            

      similar symptoms in the past.                                                               

                                                                                                  

Historical:                                                                                       

- Allergies:                                                                                      

05:38 Metformin HCl;                                                                          jd3 

- Home Meds:                                                                                      

05:38 Abilify 5 mg Oral tab 2 tabs once daily [Active]; acetaminophen 325 mg Oral tab 1 tab   jd3 

      every 4 hours [Active]; ipratropium-albuterol 0.5 mg-3 mg(2.5 mg base)/3 mL Inhl nebu 3     

      mL 4 times per day [Active]; levothyroxine 25 mcg tab 1 tab once daily [Active];            

      Novolog 100 unit/mL Sub-Q soln [Active]; Levemir 100 unit/mL subcutaneous soln              

      [Active]; simvastatin 20 mg Oral tab 1 tab once daily [Active]; Depakote Sprinkles 125      

      mg Oral cpSP 8 caps nightly [Active]; Xarelto 20 mg Oral tab 1 tab nightly [Active];        

      Zoloft 100 mg Oral tab 1 tab once daily [Active];                                           

- PMHx:                                                                                           

05:38 Hypothyroidism; Psychotic disorder w/ delusions; Diabetes - IDDM; iron deficiency;      jd3 

      Right ankle pressure ulcer; MUSCLE WEAKNESS; Depression; CHF; Dementia; CARDIAC             

      PACEMAKER; Atrial Fib; Arthritis; Anxiety; Hypertension;                                    

                                                                                                  

- Immunization history:: Adult Immunizations unknown.                                             

- Immunization history: Last tetanus immunization: unknown.                                       

- Social history:: Smoking status: unknown.                                                       

                                                                                                  

                                                                                                  

ROS:                                                                                              

07:20 Unable to obtain ROS due to patient's speech is incomprehensible, patient's inability   tw4 

      to understand questions.                                                                    

                                                                                                  

Exam:                                                                                             

07:20 Chest/axilla:  Normal chest wall appearance and motion.  Nontender with no deformity.   tw4 

      No lesions are appreciated. Cardiovascular:  Regular rate and rhythm with a normal S1       

      and S2.  No gallops, murmurs, or rubs.  Normal PMI, no JVD.  No pulse deficits.             

      Respiratory:  Lungs have equal breath sounds bilaterally, clear to auscultation and         

      percussion.  No rales, rhonchi or wheezes noted.  No increased work of breathing, no        

      retractions or nasal flaring. Abdomen/GI:  Soft, non-tender, with normal bowel sounds.      

      No distension or tympany.  No guarding or rebound.  No evidence of tenderness               

      throughout. Back:  No spinal tenderness.  No costovertebral tenderness.  Full range of      

      motion. MS/ Extremity:  Pulses equal, no cyanosis.  Neurovascular intact.  Full, normal     

      range of motion. Neuro:  Awake and alert, GCS 15, oriented to person, place, time, and      

      situation.  Cranial nerves II-XII grossly intact.  Motor strength 5/5 in all                

      extremities.  Sensory grossly intact.  Cerebellar exam normal.  Normal gait.                

07:20 Head/face: Noted is contusion, that is deep, of the  left cheek, ecchymosis, that is        

      mild,       of the  left cheek, a laceration(s), that is superficial, 20 cm(s), of the      

      left cheek.                                                                                 

                                                                                                  

Vital Signs:                                                                                      

05:35 Pulse 95; Resp 20 S; Temp 97.4(TE); Pulse Ox 100% on R/A; Weight 99.79 kg (R); Height 5 jd3 

      ft. 3 in. (160.02 cm) (R); Pain 5/10;                                                       

07:10  / 99; Pulse 97; Resp 19 S; Pulse Ox 99% on R/A;                                  jd3 

08:10  / 94; Pulse 87; Resp 18 S; Temp 97.8(TE); Pulse Ox 98% on R/A;                   aa5 

09:10  / 93; Pulse 87; Resp 16 S; Pulse Ox 98% on R/A;                                  aa5 

10:45  / 79; Pulse 83; Resp 18 S; Pulse Ox 99% on R/A;                                  aa5 

05:35 Body Mass Index 38.97 (99.79 kg, 160.02 cm)                                             jd3 

                                                                                                  

[place] Coma Score:                                                                               

05:35 Eye Response: spontaneous(4). Verbal Response: confused(4). Motor Response: localizes   jd3 

      pain(5). Total: 13.                                                                         

07:10 Eye Response: spontaneous(4). Verbal Response: confused(4). Motor Response: localizes   jd3 

      pain(5). Total: 13.                                                                         

                                                                                                  

Trauma Score (Adult):                                                                             

05:35 Eye Response: spontaneous(1); Verbal Response: confused(1); Motor Response: localizes   jd3 

      pain(1); Systolic BP: > 89 mm Hg(4); Respiratory Rate: 10 to 29 per min(4); [place]         

      Score: 13; Trauma Score: 11                                                                 

07:10 Eye Response: spontaneous(1); Verbal Response: confused(1); Motor Response: localizes   jd3 

      pain(1); Systolic BP: > 89 mm Hg(4); Respiratory Rate: 10 to 29 per min(4); [place]         

      Score: 13; Trauma Score: 11                                                                 

08:10 Eye Response: spontaneous(1); Verbal Response: confused(1); Motor Response: localizes   aa5 

      pain(1); Systolic BP: > 89 mm Hg(4); Respiratory Rate: 10 to 29 per min(4); [place]         

      Score: 13; Trauma Score: 11                                                                 

09:10 Eye Response: spontaneous(1); Verbal Response: confused(1); Motor Response: localizes   aa5 

      pain(1); Systolic BP: > 89 mm Hg(4); Respiratory Rate: 10 to 29 per min(4); [place]         

      Score: 13; Trauma Score: 11                                                                 

10:45 Eye Response: spontaneous(1); Verbal Response: confused(1); Motor Response: localizes   aa5 

      pain(1); Systolic BP: > 89 mm Hg(4); Respiratory Rate: 10 to 29 per min(4); [place]         

      Score: 13; Trauma Score: 11                                                                 

                                                                                                  

MDM:                                                                                              

05:18 Patient medically screened.                                                             tw4 

07:20 Differential diagnosis: abrasion, closed head injury, contusion. Data reviewed: vital   tw4 

      signs, nurses notes. Data interpreted: Pulse oximetry: Interpretation:. Counseling: I       

      had a detailed discussion with the patient and/or guardian regarding: the historical        

      points, exam findings, and any diagnostic results supporting the discharge/admit            

      diagnosis. Special discussion: Based on the patient's history, exam and DX evaluation,      

      there is no indication for emergent intervention or inpatient TX. It is understood by       

      the patient/guardian that if the SXs persist or worsen they need to return immediately      

      for re-evaluation. I discussed with the patient/guardian in detail that at this point       

      there is no indication for admission to the hospital. It is understood, however, that       

      if the symptoms persist or worsen the patient needs to return immediately for               

      re-evaluation.                                                                              

07:22 ED course: no suture repair needed wound cleaned and dressed with non adherent dressing.tw4 

                                                                                                  

                                                                                             

05:17 Order name: CT Head C Spine                                                             tw4 

                                                                                             

05:17 Order name: CT Facial Bones W/O Con                                                     4 

                                                                                             

05:19 Order name: Shoulder Left (2 View) XRAY                                                  

                                                                                                  

Administered Medications:                                                                         

06:56 Not Given (Physician Discretion): Ancef 1 grams IM once                                 mg2 

06:56 Drug: Ancef 1 grams Route: IVPB; Site: right forearm;                                   mg2 

06:57 Drug: hydrALAZINE 20 mg Route: IV; Rate: bolus; Site: right forearm;                    mg2 

06:57 Drug: Tetanus-Diphtheria Toxoid Adult 0.5 ml {: [employer]. Exp:         mg2 

      2020. Lot #: [id number]. } Route: IM; Site: right deltoid;                                 

07:15 Drug: fentaNYL (PF) 12.5 mcg Route: IVP; Site: right forearm;                           jd3 

                                                                                                  

                                                                                                  

Disposition:                                                                                      

20 07:19 Discharged to Home. Impression: Avulsion of scalp, Avulsion of left eye,           

  Concussion without loss of consciousness, Contusion of unspecified part of                      

  head.                                                                                           

- Condition is Stable.                                                                            

- Discharge Instructions: Contusion, Deep Skin Avulsion, Skin Tear Care, Easy-to-Read,            

  Head Injury, Adult, Easy-to-Read.                                                               

- Prescriptions for Cleocin 150 mg Oral Capsule - take 1 capsule by ORAL route every 6            

  hours for 10 days; 40 capsule.                                                                  

- SBAR form, Medication Reconciliation Form, Thank You Letter, Antibiotic Education,              

  Prescription Opioid Use form.                                                                   

- Follow up: Private Physician; When: Upon discharge from the Emergency Department;               

  Reason: Recheck today's complaints, Continuance of care, Re-evaluation by your                  

  physician.                                                                                      

- Problem is new.                                                                                 

- Symptoms have improved.                                                                         

                                                                                                  

                                                                                                  

                                                                                                  

Signatures:                                                                                       

Dispatcher [employer]                           EDMS                                                 

Anju Hartman RN RN   iw                                                   

Thomas Palencia RN RN   jSpencer Johns MD MD   tw4                                                  

Reymundo Hsieh RN RN   mg2                                                  

                                                                                                  

Corrections: (The following items were deleted from the chart)                                    

11:02 07:19 2020 07:19 Discharged to Home. Impression: Avulsion of scalp; Avulsion of   iw  

      left eye; Concussion without loss of consciousness; Contusion of unspecified part of        

      head. Condition is Stable. Forms are Medication Reconciliation Form, Thank You Letter,      

      Antibiotic Education, Prescription Opioid Use. Follow up: Private Physician; When: Upon     

      discharge from the Emergency Department; Reason: Recheck today's complaints,                

      Continuance of care, Re-evaluation by your physician. Problem is new. Symptoms have         

      improved. tw4                                                                               

                                                                                                  

**************************************************************************************************

## 2020-03-01 NOTE — ER
Nurse's Notes                                                                                     

 [facility]                                                                 

Name: Miriam Rosales                                                                                  

Age: 75 yrs                                                                                       

Sex: Female                                                                                       

: 1945                                                                                   

MRN: [id number]                                                                                   

Arrival Date: 2020                                                                          

Time: 05:15                                                                                       

Account#: [id number]                                                                            

Bed 8                                                                                             

Private MD:                                                                                       

Diagnosis: Avulsion of scalp;Avulsion of left eye;Concussion without loss of                      

  consciousness;Contusion of unspecified part of head                                             

                                                                                                  

Presentation:                                                                                     

                                                                                             

05:08 Chief complaint: EMS states: "We were called out to [facility] after a fall. the pt is   jd3 

      reported to at some point last night or this morning fallen. she is at baseline             

      mentation with her dementia, but she is on Xarelto. she has a hematoma to her left side     

      of her forehead and a skin tear noted to the underside of her left eye.". Care prior to     

      arrival: None. Mechanism of Injury: Fall out of bed. Trauma event details: Injury           

      occurred in the [facility], Injury occurred: in an institution. Injury              

      occurred: 2020.                                                                   

05:08 Method Of Arrival: EMS: [facility]                                                       jd3 

05:08 Acuity: FRIDA 2                                                                           jd3 

05:36 Coronavirus screen: The patient has NOT traveled to [place] in the past 14 days. The      jd3 

      patient has NOT had contact with known and/or suspected case of Coronavirus. Proceed        

      with normal triage procedures. Ebola Screen: Patient negative for fever greater than or     

      equal to 101.5 degrees Fahrenheit, and additional compatible Ebola Virus Disease            

      symptoms. Initial Sepsis Screen: Does the patient meet any 2 criteria? No. Patient's        

      initial sepsis screen is negative. Does the patient have a suspected source of              

      infection? No. Patient's initial sepsis screen is negative. Risk Assessment: Do you         

      want to hurt yourself or someone else? Patient reports no desire to harm self or others.    

05:39 Onset of symptoms was 2020.                                                   jd3 

05:41 Transition of care: patient was received from another setting of care (long-term care   [facility] 

      facility), [facility].                                                               

                                                                                                  

Trauma Activation: Alert                                                                          

 Physician: ED Physician; Name: Dr. Castro; Notified At:  05:08; Arrived At:            

   05:08                                                                                

 Physician: General Surgeon; Name: ; Notified At:  05:08; Arrived At:                   

 Physician: Radiology; Name: Richmond Teixeira; Notified At:  05:08; Arrived At:              

   05:08                                                                                

 Physician: Respiratory; Name: ; Notified At:  05:08; Arrived At:                       

 Physician: Lab; Name: ; Notified At:  05:08; Arrived At:                               

                                                                                                  

Historical:                                                                                       

- Allergies:                                                                                      

05:38 Metformin HCl;                                                                          jd3 

- Home Meds:                                                                                      

05:38 Abilify 5 mg Oral tab 2 tabs once daily [Active]; acetaminophen 325 mg Oral tab 1 tab   jd3 

      every 4 hours [Active]; ipratropium-albuterol 0.5 mg-3 mg(2.5 mg base)/3 mL Inhl nebu 3     

      mL 4 times per day [Active]; levothyroxine 25 mcg tab 1 tab once daily [Active];            

      Novolog 100 unit/mL Sub-Q soln [Active]; Levemir 100 unit/mL subcutaneous soln              

      [Active]; simvastatin 20 mg Oral tab 1 tab once daily [Active]; Depakote Sprinkles 125      

      mg Oral cpSP 8 caps nightly [Active]; Xarelto 20 mg Oral tab 1 tab nightly [Active];        

      Zoloft 100 mg Oral tab 1 tab once daily [Active];                                           

- PMHx:                                                                                           

05:38 Hypothyroidism; Psychotic disorder w/ delusions; Diabetes - IDDM; iron deficiency;      jd3 

      Right ankle pressure ulcer; MUSCLE WEAKNESS; Depression; CHF; Dementia; CARDIAC             

      PACEMAKER; Atrial Fib; Arthritis; Anxiety; Hypertension;                                    

                                                                                                  

- Immunization history:: Adult Immunizations unknown.                                             

- Immunization history: Last tetanus immunization: unknown.                                       

- Social history:: Smoking status: unknown.                                                       

                                                                                                  

                                                                                                  

Screenin:36 Abuse screen: Denies threats or abuse. Nutritional screening: No deficits noted.        jd3 

      Tuberculosis screening: No symptoms or risk factors identified.                             

05:39 Fall Risk Fall in past 12 months (25 points). Ambulatory Aid- None/Bed Rest/Nurse       jd3 

      Assist (0 pts). Gait- Impaired (20 pts.). Mental Status- Overestimates/Forgets              

      Limitations (15 pts.). Total Liu Fall Scale indicates High Risk Score (45 or more         

      points). Fall prevention measures have been instituted. Side Rails Up X 2 Placed Close      

      to Nursing Station Frequent Obs/Assessments Occuring.                                       

                                                                                                  

Primary Survey:                                                                                   

05:32 NO uncontrolled hemorrhage observed. A: The patient is alert. Airway: patent, No        jd3 

      supplemental oxygen in use on arrival. Oral cavity: clear, dried blood noted. Trachea       

      midline. Breathing/Chest: Respiratory pattern: regular, Respiratory effort:                 

      spontaneous, unlabored, Breath sounds: clear, bilaterally. Chest inspection:                

      symmetrical rise and fall of the chest. Circulation: Heart tones present. Pulses:           

      palpable right radial artery, right dorsalis pedis artery, left radial artery and left      

      dorsalis pedis artery. Skin color: pale, Skin temperature: warm. Disability Alert.          

      Exposure/Environment: All clothing and personal items were removed. Forensic evidence       

      collection is not deemed to be indicated at this time. Items placed in patient              

      belonging bag. There is no evidence of uncontrolled external bleeding. Obvious              

      injury(ies) are noted at this time: hematoma noted to left forehead and skin tear noted     

      to under pt's left eye. A warming method has been applied: A warm blanket has been          

      provided to the patient.                                                                    

06:30 Reassessment Airway Airway Patent Oxygen No O2 Oral cavity Clear Trachea Midline        jd3 

      Breathing/Chest Respiratory pattern Regular Respiratory effort Spontaneous Unlabored        

      Breath sounds Clear Chest inspection Symmetrical Circulation Heart tones Present Pulses     

      Palpable Color Pink Temperature Warm Disability Alert.                                      

                                                                                                  

Secondary Survey:                                                                                 

05:34 HEENT: No deficits noted. Gastrointestinal: Abdomen is soft, non-distended, Bowel       jd3 

      sounds present in all quadrants. Palpation No deficit noted. : No signs and/or            

      symptoms were reported regarding the genitourinary system. Musculoskeletal: No signs        

      and/or symptoms reported regarding the musculoskeletal system.                              

                                                                                                  

Assessment:                                                                                       

05:29 General: Appears in no apparent distress. uncomfortable, Behavior is calm,              jd3 

      inappropriate for age, restless. Pain: Unable to use pain scale. Does not appear to         

      understand pain scale. FLACC scale score is 5 out of 10. Neuro: Level of Consciousness      

      is awake, confused, Oriented to person. EENT: No signs and/or symptoms were reported        

      regarding the EENT system. Cardiovascular: Heart tones S1 S2 present Capillary refill <     

      3 seconds Patient's skin is warm and dry. Respiratory: Airway is patent Respiratory         

      effort is even, unlabored, Respiratory pattern is regular, symmetrical, Breath sounds       

      are clear bilaterally. GI: No signs and/or symptoms were reported involving the             

      gastrointestinal system. Abdomen is round non-distended, Abd is soft and non tender X 4     

      quads. : No signs and/or symptoms were reported regarding the genitourinary system.       

      Derm: Skin is intact, Skin is dry, Skin is normal, Skin temperature is warm Wound noted     

      left cheek Wound is skin tear noted under left eye. hematoma noted left forehead.           

      Musculoskeletal: No signs and/or symptoms reported regarding the musculoskeletal system.    

06:30 Reassessment: Patient appears in no apparent distress at this time. No changes from     jd3 

      previously documented assessment. Patient and/or family updated on plan of care and         

      expected duration. Pain level reassessed.                                                   

07:00 Reassessment: Report received from CHANTE Guzman. Pt currently resting in bed with eyes  aa5 

      closed, respirations even and unlabored, skin is pink/warm/dry. Awaiting disposition..      

07:58 Reassessment: Spoke to staff at [facility] to attempt to give report to nurse, staff     aa 

      requests for me to call back around 0900 to give report. .                                  

08:00 Reassessment: Pt resting in bed with eyes closed, respirations even and unlabored, skin aa5 

      is pink/warm/dry. Pt repositioned in bed. Pt is A\T\O x none to person at times, pt has     

      hx of dementia. .                                                                           

09:20 Reassessment: Resting in bed with eyes closed. Equal and unlabored, skin is             aa5 

      pink/warm/dry..                                                                             

09:26 Reassessment: Report given to Ruth (Nurse at [facility]) and states she called [facility]     aa5 

      transportation and ETA is 1hr to 1 1/2 hours. .                                             

10:00 Reassessment: Pt repositioned in bed, awaiting transportation back to nursing home. .   aa5 

10:45 Reassessment: Pt cleaned of urinary incontinence, clean brief applied. .                aa5 

10:45 Reassessment: [facility] at bedside..                                                         aa5 

                                                                                                  

Vital Signs:                                                                                      

05:35 Pulse 95; Resp 20 S; Temp 97.4(TE); Pulse Ox 100% on R/A; Weight 99.79 kg (R); Height 5 jd3 

      ft. 3 in. (160.02 cm) (R); Pain 5/10;                                                       

07:10  / 99; Pulse 97; Resp 19 S; Pulse Ox 99% on R/A;                                  jd3 

08:10  / 94; Pulse 87; Resp 18 S; Temp 97.8(TE); Pulse Ox 98% on R/A;                   aa5 

09:10  / 93; Pulse 87; Resp 16 S; Pulse Ox 98% on R/A;                                  aa5 

10:45  / 79; Pulse 83; Resp 18 S; Pulse Ox 99% on R/A;                                  aa5 

05:35 Body Mass Index 38.97 (99.79 kg, 160.02 cm)                                             jd3 

                                                                                                  

[place] Coma Score:                                                                               

05:35 Eye Response: spontaneous(4). Verbal Response: confused(4). Motor Response: localizes   jd3 

      pain(5). Total: 13.                                                                         

07:10 Eye Response: spontaneous(4). Verbal Response: confused(4). Motor Response: localizes   jd3 

      pain(5). Total: 13.                                                                         

                                                                                                  

Trauma Score (Adult):                                                                             

05:35 Eye Response: spontaneous(1); Verbal Response: confused(1); Motor Response: localizes   jd3 

      pain(1); Systolic BP: > 89 mm Hg(4); Respiratory Rate: 10 to 29 per min(4); Abraham         

      Score: 13; Trauma Score: 11                                                                 

07:10 Eye Response: spontaneous(1); Verbal Response: confused(1); Motor Response: localizes   jd3 

      pain(1); Systolic BP: > 89 mm Hg(4); Respiratory Rate: 10 to 29 per min(4); Abraham         

      Score: 13; Trauma Score: 11                                                                 

08:10 Eye Response: spontaneous(1); Verbal Response: confused(1); Motor Response: localizes   aa5 

      pain(1); Systolic BP: > 89 mm Hg(4); Respiratory Rate: 10 to 29 per min(4); Abraham         

      Score: 13; Trauma Score: 11                                                                 

09:10 Eye Response: spontaneous(1); Verbal Response: confused(1); Motor Response: localizes   aa5 

      pain(1); Systolic BP: > 89 mm Hg(4); Respiratory Rate: 10 to 29 per min(4); [place]         

      Score: 13; Trauma Score: 11                                                                 

10:45 Eye Response: spontaneous(1); Verbal Response: confused(1); Motor Response: localizes   aa5 

      pain(1); Systolic BP: > 89 mm Hg(4); Respiratory Rate: 10 to 29 per min(4); Baraham         

      Score: 13; Trauma Score: 11                                                                 

                                                                                                  

ED Course:                                                                                        

05:15 Patient arrived in ED.                                                                  bb  

05:16 Spencer Castro MD is Attending Physician.                                            tw4 

05:20 Thomas Palencia, RN is Primary Nurse.                                                  jd3 

05:29 Triage completed.                                                                       jd3 

05:36 Patient has correct armband on for positive identification. Placed in gown. Bed in low  jd3 

      position. Call light in reach. Side rails up X2.                                            

05:36 Patient maintains SpO2 saturation greater than 95% on room air.                         jd3 

05:37 Arm band placed on.                                                                     jd3 

05:39 Thermoregulation: warm blanket given to patient.                                        jd3 

05:47 CT Facial Bones W/O Con In Process Unspecified.                                         EDMS

05:48 CT Head C Spine In Process Unspecified.                                                 EDMS

05:57 Shoulder Left (2 View) XRAY In Process Unspecified.                                     EDMS

06:57 No provider procedures requiring assistance completed. Inserted saline lock: 22 gauge   mg2 

      in right forearm, using aseptic technique.                                                  

10:47 IV discontinued, intact, bleeding controlled, No redness/swelling at site. Pressure     aa5 

      dressing applied.                                                                           

                                                                                                  

Administered Medications:                                                                         

06:56 Not Given (Physician Discretion): Ancef 1 grams IM once                                 mg2 

06:56 Drug: Ancef 1 grams Route: IVPB; Site: right forearm;                                   mg2 

06:57 Drug: hydrALAZINE 20 mg Route: IV; Rate: bolus; Site: right forearm;                    mg2 

06:57 Drug: Tetanus-Diphtheria Toxoid Adult 0.5 ml {: Loopcam. Exp:         mg2 

      2020. Lot #: [id number]. } Route: IM; Site: right deltoid;                                 

07:15 Drug: fentaNYL (PF) 12.5 mcg Route: IVP; Site: right forearm;                           jd3 

                                                                                                  

                                                                                                  

Intake:                                                                                           

10:45 Pt voided once                                                                          aa5 

                                                                                                  

Outcome:                                                                                          

07:19 Discharge ordered by MD.                                                                tw4 

07:19 Patient's length of stay was not longer than 2 hours.                                   aa5 

11:00 Discharged to nursing home. Report called to  CHANTE Miranda                                 aa5 

11:00 Condition: stable                                                                           

11:00 Discharge instructions given to CHANTE Miranda and [facility] Instructed on discharge              

      instructions, follow up and referral plans. Demonstrated understanding of instructions.     

11:02 Patient left the ED.                                                                    iw  

                                                                                                  

Signatures:                                                                                       

Dispatcher [employer]                           EDRosamaria Vazquez RN RN                                                      

Anju Hartman RN RN                                                      

Ines Campa RN RN aa                                                  

Thomas Palencia RN RN jd3                                                  

Spencer Castro MD MD   tw4                                                  

Reymundo Hsieh RN                    RN   mg2                                                  

                                                                                                  

Corrections: (The following items were deleted from the chart)                                    

11:17 07:00 Reassessment: Report received from CHANTE Guzman. Pt currently resting in bed with aa5 

      eyes closed, respirations even and unlabored, skin is pink/warm/dry. . aa5                  

                                                                                                  

**************************************************************************************************

## 2020-03-01 NOTE — RAD REPORT
EXAM DESCRIPTION:  RAD - Shoulder  Left 2 View - 3/1/2020 5:59 am

 

CLINICAL HISTORY:  DEFORMITY

Pain and swelling

 

COMPARISON:  No comparisons

 

FINDINGS:  Mild AC joint and glenohumeral joint arthritic changes are present. No acute fracture or d
islocation seen.

## 2020-03-02 NOTE — RAD REPORT
EXAM DESCRIPTION:    CT Maxillofacial Without Intravenous Contrast

 

CLINICAL HISTORY:   The patient is 75 years old and is Female; TRAUMA pain

 

TECHNIQUE:  Axial computed tomography images of the face without intravenous contrast.   Sagittal and
 coronal reformatted images were created and reviewed.   This CT exam was performed using one or more
 of the following dose reduction techniques:   automated exposure control, adjustment of the mA and/o
r kV according to patient size, and/or use of iterative reconstruction technique.

 

COMPARISON:  No relevant prior studies available.

 

FINDINGS:  BONES/JOINTS:   The orbital floors and walls are intact.   The zygomatic arches and pteryg
oid plates are intact.   The visualized maxilla and mandible are intact.

   SOFT TISSUES:   Unremarkable.

   ORBITS:   The globes, extraocular muscles, and optic nerve complexes are within normal limits.

   SINUSES:   The visualized paranasal sinuses are clear.   No air-fluid levels.

   NASAL CAVITY/SEPTUM:   The nasal bones are intact.

 

IMPRESSION:  No acute findings in the face.

 

Electronically signed by:   Nadiya Quezada MD   3/1/2020 5:57 AM CST Workstation: [id number]

 

 

Due to temporary technical issues with the PACS/Fluency reporting system, reports are being signed by
 the in house radiologist as a courtesy to ensure prompt reporting. The interpreting radiologist is f
ully responsible for the content of the report.

## 2020-03-02 NOTE — RAD REPORT
EXAM DESCRIPTION:     CT Head and Cervical Spine Without Intravenous Contrast

 

CLINICAL HISTORY:  The patient is 75 years old and is Female; PAIN

 

TECHNIQUE:  Axial computed tomography images of the head/brain and cervical spine without intravenous
 contrast.   Sagittal and coronal reformatted images were created and reviewed.   This CT exam was pe
rformed using one or more of the following dose reduction techniques:   automated exposure control, a
djustment of the mA and/or kV according to patient size, and/or use of iterative reconstruction techn
ique.

 

COMPARISON:  No relevant prior studies available.

 

FINDINGS:  BRAIN:   There is diffuse cerebral atrophy present, consistent with this patient's age.   
There is patchy hypoattenuation of the deep white matter which is non-specific, but most likely owing
 to chronic small vessel ischemic change in a patient of this age group.   No intracranial hemorrhage
, mass effect, or midline shift is seen. There are no extra-axial fluid collections.

   VENTRICLES:   Unremarkable.   No ventriculomegaly.

   SKULL:   The bones are diffusely osteopenic. Vertebral body heights and alignment are maintained. 
  No acute fracture.

   SINUSES:   Unremarkable as visualized.   No acute sinusitis.

   MASTOID AIR CELLS:   Unremarkable as visualized.   No mastoid effusion.

   VERTEBRAE:   See above.

   DISCS/SPINAL CANAL/NEURAL FORAMINA:   There is multilevel degenerative change with intervertebral 
disc space narrowing, osteophyte formation, facet arthropathy is present. Neural foraminal narrowing 
is noted at multiple levels secondary to disc osteophyte complexes.

   SOFT TISSUES:   Left frontal scalp soft tissue swelling is present.

   LUNG APICES:   The lung apices are clear.

 

IMPRESSION:  1.   Age-related atrophy and chronic white matter ischemic changes, with no evidence of 
an acute intracranial abnormality. Left frontal scalp soft tissue swelling.

2.   Moderate spondylosis of the cervical spine without acute findings.

 

Electronically signed by:   Nadiya Quezada MD   3/1/2020 6:05 AM CST Workstation: [id number]

 

 

 

Due to temporary technical issues with the PACS/Fluency reporting system, reports are being signed by
 the in house radiologist as a courtesy to ensure prompt reporting. The interpreting radiologist is f
ully responsible for the content of the report.